# Patient Record
(demographics unavailable — no encounter records)

---

## 2024-10-10 NOTE — HISTORY OF PRESENT ILLNESS
[FreeTextEntry1] : New patient evaluation.  80 yo woman, originally from [facility]. [place]ame to [place] in 1953.  HTN HLD DM2 CKD2 PUD hypothyroid depression heart failure, diastolic; treated with diuretics at one point in 2019 (hot summer in [place]) no known h/o CAD  Echo 7/2024: LVEF 55-60%, impaired LV relaxation mildly dilated LA no significant valvular heart disease  Labs 5-7/2024: CBC normal A1c 6.3% creat 1.35; other CMP normal tchol 139, HDL 52, LDL 49, trig 192  Seen in the ED in late July for dizziness. No acute ischemic changes on MRI. Unclear etiology of symptoms. Since that time, no recurrent dizziness. She has a foot drop; otherwise, doing well with P.T. Followed by neurology. No chest pain, pressure. No PALUMBO, SOB, palpitations, syncope.  She has lost significant weight as of late. No diuretic requirement.  Recent COVID; not hospitalized.  Tries to limit salt in diet.  Compliant with medications (told to take Entresto daily, not BID)  EKG today: SR

## 2024-10-10 NOTE — HISTORY OF PRESENT ILLNESS
[FreeTextEntry1] : New patient evaluation.  80 yo woman, originally from [facility]. [place] in 1953.  HTN HLD DM2 CKD2 PUD hypothyroid depression heart failure, diastolic; treated with diuretics at one point in 2019 (hot summer in [place]) no known h/o CAD  Echo 7/2024: LVEF 55-60%, impaired LV relaxation mildly dilated LA no significant valvular heart disease  Labs 5-7/2024: CBC normal A1c 6.3% creat 1.35; other CMP normal tchol 139, HDL 52, LDL 49, trig 192  Seen in the ED in late July for dizziness. No acute ischemic changes on MRI. Unclear etiology of symptoms. Since that time, no recurrent dizziness. She has a foot drop; otherwise, doing well with P.T. Followed by neurology. No chest pain, pressure. No PALUMBO, SOB, palpitations, syncope.  She has lost significant weight as of late. No diuretic requirement.  Recent COVID; not hospitalized.  Tries to limit salt in diet.  Compliant with medications (told to take Entresto daily, not BID)  EKG today: SR

## 2024-11-12 NOTE — PLAN
[FreeTextEntry1] : Medications reconciled. Blood work reviewed. PCV20 today. Pt advised to sign up for [facility] portal to review labs and communicate any questions or concerns directly. Yearly physical and return as needed for illness, medication refills, and new or existing complaints. f/u 4 months.

## 2024-11-12 NOTE — HISTORY OF PRESENT ILLNESS
[de-identified] : 81 year old F with PMH multiple medical issues presents for establish care and follow up of chronic conditions. She had recent blood work and records were reviewed. Pt denies CP/SOB, fever/chills, n/v/d/c.

## 2024-11-12 NOTE — HEALTH RISK ASSESSMENT
[Good] : ~his/her~  mood as  good [No] : In the past 12 months have you used drugs other than those required for medical reasons? No [No falls in past year] : Patient reported no falls in the past year [0] : 2) Feeling down, depressed, or hopeless: Not at all (0) [PHQ-2 Negative - No further assessment needed] : PHQ-2 Negative - No further assessment needed [Former] : Former [0-4] : 0-4 [> 15 Years] : > 15 Years [NO] : No [Patient reported mammogram was normal] : Patient reported mammogram was normal [Patient reported colonoscopy was normal] : Patient reported colonoscopy was normal [None] : None [With Family] : lives with family [Retired] : retired [] :  [Fully functional (bathing, dressing, toileting, transferring, walking, feeding)] : Fully functional (bathing, dressing, toileting, transferring, walking, feeding) [Fully functional (using the telephone, shopping, preparing meals, housekeeping, doing laundry, using] : Fully functional and needs no help or supervision to perform IADLs (using the telephone, shopping, preparing meals, housekeeping, doing laundry, using transportation, managing medications and managing finances) [Reports normal functional visual acuity (ie: able to read med bottle)] : Reports normal functional visual acuity [Patient/Caregiver not ready to engage] : , patient/caregiver not ready to engage [Audit-CScore] : 0 [[id number]] : 0 [Change in mental status noted] : No change in mental status noted [Language] : denies difficulty with language [Behavior] : denies difficulty with behavior [Learning/Retaining New Information] : denies difficulty learning/retaining new information [Handling Complex Tasks] : denies difficulty handling complex tasks [Reasoning] : denies difficulty with reasoning [Spatial Ability and Orientation] : denies difficulty with spatial ability and orientation [Reports changes in hearing] : Reports no changes in hearing [Reports changes in vision] : Reports no changes in vision [AdvancecareDate] : 11/24

## 2025-01-09 NOTE — HISTORY OF PRESENT ILLNESS
[FreeTextEntry1] : Follow-up patient evaluation for diastolic dysfunction and CV risk factors.  82 yo woman, originally from [facility]. [place] in [place].  HTN HLD DM2 CKD2 PUD hypothyroid depression heart failure, diastolic; treated with diuretics at one point in 2019 (hot summer in [place]) no known h/o CAD  Echo 7/2024: LVEF 55-60%, impaired LV relaxation mildly dilated LA no significant valvular heart disease  Labs 5-7/2024: CBC normal A1c 6.3% creat 1.35; other CMP normal tchol 139, HDL 52, LDL 49, trig 192  Seen in the ED in late July for dizziness. No acute ischemic changes on MRI. Unclear etiology of symptoms.  At last visit with me in Oct 2024, the following issues were discussed: CAD (coronary artery disease) (414.00) (I25.10) No known h/o CAD. States she had a cardiac cath in the past (no CAD). I asked her to     get the records for my review. Depression (311) (F32.A) Continues on sertraline. Hypercholesteremia (272.0) (E78.00) On statin therapy. Continue. Type 2 diabetes mellitus (250.00) (E11.9) Last A1c under good control. Continue present medications. Cardiomyopathy (425.4) (I42.9) Perhaps a h/o diastolic HF. In 2019, had "fluid on her lungs", improved with Lasix. No     diuretics since then. Recent echo with "impaired LV relaxation", c/w diastolic dysfunction,     which I explained to her is very common in women her age. Continue with BP     and DM management. No indication for further evaluation or therapy at this time. Weight     management (she has lost 40lbs) and diet control (low salt). Dizziness (780.4) (R42) Episode of dizziness, unclear etiology. No recurrences.  Labs Nov 2024: TSH normal CMP normal tchol 148, trig 175, HDL 53, LDL 66 mg/dL A1c 5.7%  Since last visit, no changes in medications. Compliant with medication. Feels well. No need for diuretics; no swelling.  No CV symptoms.   EKG today:  Sinus Rhythm  Baseline artifact -Poor R-wave progression -nonspecific -consider old anterior infarct. ABNORMAL ECG

## 2025-01-09 NOTE — HISTORY OF PRESENT ILLNESS
[FreeTextEntry1] : Follow-up patient evaluation for diastolic dysfunction and CV risk factors.  80 yo woman, originally from [facility]. [place] in [place].  HTN HLD DM2 CKD2 PUD hypothyroid depression heart failure, diastolic; treated with diuretics at one point in 2019 (hot summer in [place]) no known h/o CAD  Echo 7/2024: LVEF 55-60%, impaired LV relaxation mildly dilated LA no significant valvular heart disease  Labs 5-7/2024: CBC normal A1c 6.3% creat 1.35; other CMP normal tchol 139, HDL 52, LDL 49, trig 192  Seen in the ED in late July for dizziness. No acute ischemic changes on MRI. Unclear etiology of symptoms.  At last visit with me in Oct 2024, the following issues were discussed: CAD (coronary artery disease) (414.00) (I25.10) No known h/o CAD. States she had a cardiac cath in the past (no CAD). I asked her to     get the records for my review. Depression (311) (F32.A) Continues on sertraline. Hypercholesteremia (272.0) (E78.00) On statin therapy. Continue. Type 2 diabetes mellitus (250.00) (E11.9) Last A1c under good control. Continue present medications. Cardiomyopathy (425.4) (I42.9) Perhaps a h/o diastolic HF. In 2019, had "fluid on her lungs", improved with Lasix. No     diuretics since then. Recent echo with "impaired LV relaxation", c/w diastolic dysfunction,     which I explained to her is very common in women her age. Continue with BP     and DM management. No indication for further evaluation or therapy at this time. Weight     management (she has lost 40lbs) and diet control (low salt). Dizziness (780.4) (R42) Episode of dizziness, unclear etiology. No recurrences.  Labs Nov 2024: TSH normal CMP normal tchol 148, trig 175, HDL 53, LDL 66 mg/dL A1c 5.7%  Since last visit, no changes in medications. Compliant with medication. Feels well. No need for diuretics; no swelling.  No CV symptoms.   EKG today:  Sinus Rhythm  Baseline artifact -Poor R-wave progression -nonspecific -consider old anterior infarct. ABNORMAL ECG    audio

## 2025-01-09 NOTE — REVIEW OF SYSTEMS
[Weight Loss (___ Lbs)] : [unfilled] ~Ulb weight loss [Negative] : Heme/Lymph [FreeTextEntry2] : remains active

## 2025-03-10 NOTE — HISTORY OF PRESENT ILLNESS
[de-identified] : 81 year old F with PMH multiple medical issues presents for follow up of chronic conditions. Pt denies CP/SOB, fever/chills, n/v/d/c.

## 2025-03-10 NOTE — PLAN
[FreeTextEntry1] : Routine blood work drawn in office and urine collected today. Titers requested. Pt advised to sign up for [facility] portal to review labs and communicate any questions or concerns directly. Yearly physical and return as needed for illness, medication refills, and new or existing complaints. CPE in November may be appropriate if no concerning blood work findings. Otherwise, will schedule follow up.

## 2025-05-19 NOTE — HISTORY OF PRESENT ILLNESS
[de-identified] : Ms. ELPIDIO GUO is a 81 year old woman presents today for evaluation of left hip pain after a fall. She was seen at [facility] on 5/1/25 where x-rays were performed. She ambulates with a cane  Reports on May 1st patient fell onto the left hip. Was carrying something heavy and fell over two steps. She was able to stand afterwards and continue her daily activities. Later that night reports more severe pain and worsening ability to walk and was seen at urgent the same day Xray was performed negative for any acute fractures. Was given tylenol  Reports the pain has slightly improved however at times still reports some shooting pain down the lateral side of the leg.  Pain primarily on the lateral side of the hip  Denies any numbness, weakness or tingling sensation

## 2025-05-19 NOTE — DISCUSSION/SUMMARY
[de-identified] : Discussed findings of today's exam and possible causes of patient's pain.  Educated patient on their most probable diagnosis of left hip pain status post fall due to trochanteric bursitis and peritrochanteric tendinitis.  Reviewed possible courses of treatment, and we collaboratively decided best course of treatment at this time will include conservative management.  Patient started on a course of topical NSAIDs, prescription given for diclofenac gel 1% to be applied to the area of pain 2-3 times daily as needed (We discussed all possible side effects of this medication).  Patient will be started on a course of physical therapy to restore normal range of motion and strength as tolerated.  Recommend heat before activity, ice after activity.  If patient has persisting pain may consider cortisone injection at that time.  Patient and adult daughter appreciate and agree with current plan.  This note was generated using dragon medical dictation software.  A reasonable effort has been made for proofreading its contents, but typos may still remain.  If there are any questions or points of clarification needed please notify my office.

## 2025-05-19 NOTE — PHYSICAL EXAM
[de-identified] : Constitutional: Well-nourished, well-developed, No acute distress Respiratory:  Good respiratory effort, no SOB Psychiatric: Pleasant and normal affect, alert and oriented x3 Musculoskeletal: normal except where as noted in regional exam   Left Hip:   APPEARANCE: no marked deformities, no swelling or malalignment POSITIVE TENDERNESS: Greater trochanter, and mild distally along ITB NONTENDER: TFL, gluteal region, ischium/proximal hamstring region, hip flexor region, sartorius and pubic symphysis. ROM: full & painless. RESISTIVE TESTING: Mild pain in lateral hip with resisted abduction, painless resisted ER/IR/SLR/adduction. SPECIAL TESTS: neg DHRUV/FADIR, painless loaded flexion & scouring [de-identified] : I reviewed, interpreted and clinically correlated the following outside imaging studies, [facility] urgent care x-rays, 2 views of the left hip, evidence of moderate to early severe femoral acetabular joint osteoarthritis, no fracture seen, no malalignment.
